# Patient Record
Sex: MALE | Race: WHITE | HISPANIC OR LATINO | URBAN - METROPOLITAN AREA
[De-identification: names, ages, dates, MRNs, and addresses within clinical notes are randomized per-mention and may not be internally consistent; named-entity substitution may affect disease eponyms.]

---

## 2017-01-05 ENCOUNTER — OUTPATIENT (OUTPATIENT)
Dept: OUTPATIENT SERVICES | Facility: HOSPITAL | Age: 32
LOS: 1 days | End: 2017-01-05
Payer: COMMERCIAL

## 2017-01-05 VITALS
WEIGHT: 184.97 LBS | TEMPERATURE: 98 F | DIASTOLIC BLOOD PRESSURE: 70 MMHG | HEART RATE: 60 BPM | SYSTOLIC BLOOD PRESSURE: 110 MMHG | OXYGEN SATURATION: 98 % | HEIGHT: 78 IN | RESPIRATION RATE: 16 BRPM

## 2017-01-05 DIAGNOSIS — Z98.890 OTHER SPECIFIED POSTPROCEDURAL STATES: Chronic | ICD-10-CM

## 2017-01-05 DIAGNOSIS — S43.432A SUPERIOR GLENOID LABRUM LESION OF LEFT SHOULDER, INITIAL ENCOUNTER: ICD-10-CM

## 2017-01-05 DIAGNOSIS — S42.292A OTHER DISPLACED FRACTURE OF UPPER END OF LEFT HUMERUS, INITIAL ENCOUNTER FOR CLOSED FRACTURE: ICD-10-CM

## 2017-01-05 DIAGNOSIS — M25.312 OTHER INSTABILITY, LEFT SHOULDER: ICD-10-CM

## 2017-01-05 DIAGNOSIS — S42.293A OTHER DISPLACED FRACTURE OF UPPER END OF UNSPECIFIED HUMERUS, INITIAL ENCOUNTER FOR CLOSED FRACTURE: ICD-10-CM

## 2017-01-05 DIAGNOSIS — Z01.818 ENCOUNTER FOR OTHER PREPROCEDURAL EXAMINATION: ICD-10-CM

## 2017-01-05 LAB
ANION GAP SERPL CALC-SCNC: 8 MMOL/L — SIGNIFICANT CHANGE UP (ref 5–17)
BUN SERPL-MCNC: 15 MG/DL — SIGNIFICANT CHANGE UP (ref 7–18)
CALCIUM SERPL-MCNC: 8.9 MG/DL — SIGNIFICANT CHANGE UP (ref 8.4–10.5)
CHLORIDE SERPL-SCNC: 106 MMOL/L — SIGNIFICANT CHANGE UP (ref 96–108)
CO2 SERPL-SCNC: 25 MMOL/L — SIGNIFICANT CHANGE UP (ref 22–31)
CREAT SERPL-MCNC: 0.89 MG/DL — SIGNIFICANT CHANGE UP (ref 0.5–1.3)
GLUCOSE SERPL-MCNC: 91 MG/DL — SIGNIFICANT CHANGE UP (ref 70–99)
HCT VFR BLD CALC: 46.1 % — SIGNIFICANT CHANGE UP (ref 39–50)
HGB BLD-MCNC: 15.9 G/DL — SIGNIFICANT CHANGE UP (ref 13–17)
MCHC RBC-ENTMCNC: 31.8 PG — SIGNIFICANT CHANGE UP (ref 27–34)
MCHC RBC-ENTMCNC: 34.4 GM/DL — SIGNIFICANT CHANGE UP (ref 32–36)
MCV RBC AUTO: 92.5 FL — SIGNIFICANT CHANGE UP (ref 80–100)
PLATELET # BLD AUTO: 262 K/UL — SIGNIFICANT CHANGE UP (ref 150–400)
POTASSIUM SERPL-MCNC: 4.1 MMOL/L — SIGNIFICANT CHANGE UP (ref 3.5–5.3)
POTASSIUM SERPL-SCNC: 4.1 MMOL/L — SIGNIFICANT CHANGE UP (ref 3.5–5.3)
RBC # BLD: 4.99 M/UL — SIGNIFICANT CHANGE UP (ref 4.2–5.8)
RBC # FLD: 11.4 % — SIGNIFICANT CHANGE UP (ref 10.3–14.5)
SODIUM SERPL-SCNC: 139 MMOL/L — SIGNIFICANT CHANGE UP (ref 135–145)
WBC # BLD: 8.1 K/UL — SIGNIFICANT CHANGE UP (ref 3.8–10.5)
WBC # FLD AUTO: 8.1 K/UL — SIGNIFICANT CHANGE UP (ref 3.8–10.5)

## 2017-01-05 PROCEDURE — 85027 COMPLETE CBC AUTOMATED: CPT

## 2017-01-05 PROCEDURE — 80048 BASIC METABOLIC PNL TOTAL CA: CPT

## 2017-01-05 PROCEDURE — G0463: CPT

## 2017-01-05 RX ORDER — SODIUM CHLORIDE 9 MG/ML
3 INJECTION INTRAMUSCULAR; INTRAVENOUS; SUBCUTANEOUS EVERY 8 HOURS
Qty: 0 | Refills: 0 | Status: DISCONTINUED | OUTPATIENT
Start: 2017-01-09 | End: 2017-01-10

## 2017-01-05 RX ORDER — ACETAMINOPHEN 500 MG
975 TABLET ORAL ONCE
Qty: 0 | Refills: 0 | Status: COMPLETED | OUTPATIENT
Start: 2017-01-09 | End: 2017-01-09

## 2017-01-05 RX ORDER — CELECOXIB 200 MG/1
200 CAPSULE ORAL ONCE
Qty: 0 | Refills: 0 | Status: COMPLETED | OUTPATIENT
Start: 2017-01-09 | End: 2017-01-09

## 2017-01-05 NOTE — H&P PST ADULT - MUSCULOSKELETAL
details… detailed exam no calf tenderness/no joint swelling/no joint erythema/left shoulder, TTP/no joint warmth/decreased ROM due to pain

## 2017-01-05 NOTE — H&P PST ADULT - ASSESSMENT
31 years old male presented with instability of left shoulder and unspecific location of left shoulder joint . 31 years old male presented with instability of left shoulder, closed Hill-Sach fracture of left shoulder  and superior glenoid labrum lesion of left shoulder . 31 years old male presented with instability of left shoulder and superior glenoid labrum lesion of left shoulder .

## 2017-01-05 NOTE — H&P PST ADULT - PROBLEM SELECTOR PLAN 2
Patient  is scheduled for left shoulder arthroscopy and anterior bankhart repair, left shoulder scalp repair and remplissage, left shoulder biceps tenodesis possible tenotomy on 1/9/17. Patient is at average risk for this surgery.

## 2017-01-05 NOTE — H&P PST ADULT - NEGATIVE ENMT SYMPTOMS
no dysphagia/hx of Schatzki's ring/no throat pain/no ear pain/no hearing difficulty no hearing difficulty/no dysphagia/hx of Schatzki's ring in 2015  -difficulty swallowing meat, pt denies dyspagia since then/no throat pain/no ear pain

## 2017-01-05 NOTE — H&P PST ADULT - FAMILY HISTORY
Mother  Still living? Yes, Estimated age: Age Unknown  Family history of breast cancer, Age at diagnosis: Age Unknown

## 2017-01-05 NOTE — H&P PST ADULT - PMH
Closed Hill-Sachs fracture of left humerus    Dysphagia    Gastritis    Instability of left shoulder joint    Schatzki's ring    Shoulder dislocation, left, sequela    Superior glenoid labrum lesion of left shoulder    Tendinitis of elbow Closed Hill-Sachs fracture of left humerus    Dysphagia    Gastritis    Instability of left shoulder joint    Schatzki's ring  in 2015  Shoulder dislocation, left, sequela    Superior glenoid labrum lesion of left shoulder    Tendinitis of elbow Dysphagia    Gastritis    Instability of left shoulder joint    Schatzki's ring  in 2015  Shoulder dislocation, left, sequela    Superior glenoid labrum lesion of left shoulder    Tendinitis of elbow

## 2017-01-05 NOTE — H&P PST ADULT - NSANTHOSAYNRD_GEN_A_CORE
No. SUZAN screening performed.  STOP BANG Legend: 0-2 = LOW Risk; 3-4 = INTERMEDIATE Risk; 5-8 = HIGH Risk

## 2017-01-05 NOTE — H&P PST ADULT - HISTORY OF PRESENT ILLNESS
31 years old male presented with left shoulder pain and limited ROM , hx of shoulder dislocation x 4 times, 2 dislocations  were returned to its position on its own. Diagnosed with instability of left shoulder and unspecific location of left shoulder joint and is scheduled for left shoulder arthroscopy and anterior bankhart repair, left shoulder scalp repair and remplissage, left shoulder biceps tenodesis possible tenotomy on 1/9/17. 31 years old male presented with left shoulder pain and limited ROM , hx of shoulder dislocation x 4 times, 2 dislocations  were returned to its position on its own. Diagnosed with instability of left shoulder and superior glenoid labrum lesion of left shoulder  and Closed Hill Sach fracture of left shoulder and is scheduled for left shoulder arthroscopy and anterior bankhart repair, left shoulder scalp repair and remplissage, left shoulder biceps tenodesis possible tenotomy on 1/9/17. 31 years old male presented with left shoulder pain and limited ROM , hx of shoulder dislocation x 4 times, 2 dislocations  were returned to its position on its own. Diagnosed with instability of left shoulder and superior glenoid labrum lesion of left shoulder  and is scheduled for left shoulder arthroscopy and anterior bankhart repair, left shoulder scalp repair and remplissage, left shoulder biceps tenodesis possible tenotomy on 1/9/17.

## 2017-01-05 NOTE — H&P PST ADULT - NEGATIVE GASTROINTESTINAL SYMPTOMS
no flatulence/no abdominal pain/no change in bowel habits/no vomiting/no diarrhea/no constipation/no nausea

## 2017-01-09 ENCOUNTER — INPATIENT (INPATIENT)
Facility: HOSPITAL | Age: 32
LOS: 0 days | Discharge: HOME | DRG: 501 | End: 2017-01-10
Attending: ORTHOPAEDIC SURGERY | Admitting: ORTHOPAEDIC SURGERY
Payer: COMMERCIAL

## 2017-01-09 VITALS
DIASTOLIC BLOOD PRESSURE: 66 MMHG | SYSTOLIC BLOOD PRESSURE: 116 MMHG | HEIGHT: 78 IN | OXYGEN SATURATION: 97 % | HEART RATE: 87 BPM | WEIGHT: 184.97 LBS | TEMPERATURE: 97 F | RESPIRATION RATE: 18 BRPM

## 2017-01-09 DIAGNOSIS — S42.292A OTHER DISPLACED FRACTURE OF UPPER END OF LEFT HUMERUS, INITIAL ENCOUNTER FOR CLOSED FRACTURE: ICD-10-CM

## 2017-01-09 DIAGNOSIS — S43.432A SUPERIOR GLENOID LABRUM LESION OF LEFT SHOULDER, INITIAL ENCOUNTER: ICD-10-CM

## 2017-01-09 DIAGNOSIS — M25.312 OTHER INSTABILITY, LEFT SHOULDER: ICD-10-CM

## 2017-01-09 DIAGNOSIS — Z98.890 OTHER SPECIFIED POSTPROCEDURAL STATES: Chronic | ICD-10-CM

## 2017-01-09 PROCEDURE — 29828 SHO ARTHRS SRG BICP TENODSIS: CPT | Mod: AS,LT

## 2017-01-09 RX ORDER — HYDROMORPHONE HYDROCHLORIDE 2 MG/ML
0.5 INJECTION INTRAMUSCULAR; INTRAVENOUS; SUBCUTANEOUS
Qty: 0 | Refills: 0 | Status: DISCONTINUED | OUTPATIENT
Start: 2017-01-09 | End: 2017-01-09

## 2017-01-09 RX ORDER — CEFAZOLIN SODIUM 1 G
1000 VIAL (EA) INJECTION EVERY 8 HOURS
Qty: 0 | Refills: 0 | Status: COMPLETED | OUTPATIENT
Start: 2017-01-09 | End: 2017-01-10

## 2017-01-09 RX ORDER — ASPIRIN/CALCIUM CARB/MAGNESIUM 324 MG
325 TABLET ORAL DAILY
Qty: 0 | Refills: 0 | Status: DISCONTINUED | OUTPATIENT
Start: 2017-01-09 | End: 2017-01-10

## 2017-01-09 RX ORDER — HYDROMORPHONE HYDROCHLORIDE 2 MG/ML
0.5 INJECTION INTRAMUSCULAR; INTRAVENOUS; SUBCUTANEOUS EVERY 4 HOURS
Qty: 0 | Refills: 0 | Status: DISCONTINUED | OUTPATIENT
Start: 2017-01-09 | End: 2017-01-10

## 2017-01-09 RX ORDER — DEXAMETHASONE 0.5 MG/5ML
8 ELIXIR ORAL ONCE
Qty: 0 | Refills: 0 | Status: DISCONTINUED | OUTPATIENT
Start: 2017-01-09 | End: 2017-01-09

## 2017-01-09 RX ORDER — SODIUM CHLORIDE 9 MG/ML
1000 INJECTION, SOLUTION INTRAVENOUS
Qty: 0 | Refills: 0 | Status: DISCONTINUED | OUTPATIENT
Start: 2017-01-09 | End: 2017-01-10

## 2017-01-09 RX ORDER — ONDANSETRON 8 MG/1
4 TABLET, FILM COATED ORAL ONCE
Qty: 0 | Refills: 0 | Status: DISCONTINUED | OUTPATIENT
Start: 2017-01-09 | End: 2017-01-09

## 2017-01-09 RX ORDER — HYDROMORPHONE HYDROCHLORIDE 2 MG/ML
0.5 INJECTION INTRAMUSCULAR; INTRAVENOUS; SUBCUTANEOUS
Qty: 0 | Refills: 0 | Status: DISCONTINUED | OUTPATIENT
Start: 2017-01-09 | End: 2017-01-10

## 2017-01-09 RX ADMIN — CELECOXIB 200 MILLIGRAM(S): 200 CAPSULE ORAL at 11:06

## 2017-01-09 RX ADMIN — Medication 100 MILLIGRAM(S): at 21:52

## 2017-01-09 RX ADMIN — SODIUM CHLORIDE 3 MILLILITER(S): 9 INJECTION INTRAMUSCULAR; INTRAVENOUS; SUBCUTANEOUS at 21:37

## 2017-01-09 RX ADMIN — Medication 975 MILLIGRAM(S): at 11:06

## 2017-01-09 NOTE — ASU PATIENT PROFILE, ADULT - PMH
Dysphagia    Gastritis    Instability of left shoulder joint    Schatzki's ring  in 2015  Shoulder dislocation, left, sequela    Superior glenoid labrum lesion of left shoulder    Tendinitis of elbow

## 2017-01-10 VITALS
RESPIRATION RATE: 16 BRPM | TEMPERATURE: 98 F | DIASTOLIC BLOOD PRESSURE: 66 MMHG | SYSTOLIC BLOOD PRESSURE: 127 MMHG | OXYGEN SATURATION: 97 % | HEART RATE: 87 BPM

## 2017-01-10 PROCEDURE — 90686 IIV4 VACC NO PRSV 0.5 ML IM: CPT

## 2017-01-10 PROCEDURE — 86900 BLOOD TYPING SEROLOGIC ABO: CPT

## 2017-01-10 PROCEDURE — 86901 BLOOD TYPING SEROLOGIC RH(D): CPT

## 2017-01-10 PROCEDURE — 88304 TISSUE EXAM BY PATHOLOGIST: CPT | Mod: 26

## 2017-01-10 PROCEDURE — 88304 TISSUE EXAM BY PATHOLOGIST: CPT

## 2017-01-10 PROCEDURE — C1713: CPT

## 2017-01-10 PROCEDURE — 99231 SBSQ HOSP IP/OBS SF/LOW 25: CPT

## 2017-01-10 PROCEDURE — 86850 RBC ANTIBODY SCREEN: CPT

## 2017-01-10 RX ORDER — CEPHALEXIN 500 MG
1 CAPSULE ORAL
Qty: 12 | Refills: 0 | OUTPATIENT
Start: 2017-01-10 | End: 2017-01-13

## 2017-01-10 RX ORDER — ASPIRIN/CALCIUM CARB/MAGNESIUM 324 MG
1 TABLET ORAL
Qty: 14 | Refills: 0 | OUTPATIENT
Start: 2017-01-10 | End: 2017-01-24

## 2017-01-10 RX ORDER — IBUPROFEN 200 MG
1 TABLET ORAL
Qty: 0 | Refills: 0 | COMMUNITY

## 2017-01-10 RX ORDER — INFLUENZA VIRUS VACCINE 15; 15; 15; 15 UG/.5ML; UG/.5ML; UG/.5ML; UG/.5ML
0.5 SUSPENSION INTRAMUSCULAR ONCE
Qty: 0 | Refills: 0 | Status: COMPLETED | OUTPATIENT
Start: 2017-01-10 | End: 2017-01-10

## 2017-01-10 RX ORDER — OXYCODONE HYDROCHLORIDE 5 MG/1
1 TABLET ORAL
Qty: 28 | Refills: 0 | OUTPATIENT
Start: 2017-01-10 | End: 2017-01-17

## 2017-01-10 RX ADMIN — INFLUENZA VIRUS VACCINE 0.5 MILLILITER(S): 15; 15; 15; 15 SUSPENSION INTRAMUSCULAR at 12:45

## 2017-01-10 RX ADMIN — SODIUM CHLORIDE 3 MILLILITER(S): 9 INJECTION INTRAMUSCULAR; INTRAVENOUS; SUBCUTANEOUS at 12:47

## 2017-01-10 RX ADMIN — SODIUM CHLORIDE 3 MILLILITER(S): 9 INJECTION INTRAMUSCULAR; INTRAVENOUS; SUBCUTANEOUS at 06:25

## 2017-01-10 RX ADMIN — Medication 100 MILLIGRAM(S): at 06:25

## 2017-01-10 RX ADMIN — Medication 325 MILLIGRAM(S): at 12:46

## 2017-01-10 NOTE — DISCHARGE NOTE ADULT - PATIENT PORTAL LINK FT
“You can access the [facility] Patient Portal, offered by [facility], by registering with the following website: [email or web address]”

## 2017-01-10 NOTE — DISCHARGE NOTE ADULT - HOSPITAL COURSE
30 y/o M s/p L shoulder arthroscopy and mini open biceps tenodesis on 1/9/17. No complications. Stable for D/c home.

## 2017-01-10 NOTE — DISCHARGE NOTE ADULT - CARE PROVIDER_API CALL
Son Schumacher), Orthopaedic Surgery  [address] [place] [address]  Phone: [phone]  Fax: [phone]

## 2017-01-10 NOTE — DISCHARGE NOTE ADULT - CARE PLAN
Principal Discharge DX:	Superior glenoid labrum lesion of left shoulder  Goal:	-S/p L shoulder arthroscopy and Mini open biceps tenodesis  Instructions for follow-up, activity and diet:	-Pain control  -Deep vein thrombosis prophylaxis- Aspirin Daily for 2 weeks  -Keep Left arm in sling- Non weight bearing unless advised by your surgeon.   -Keflex 4 times a day for 3 days  -Incentive spirometer  -Follow up with  in one week  -If tingling in fingers worsen notify MD and report to ED immediately.   -If any fever of 101 or greater report to ED.

## 2017-01-10 NOTE — DISCHARGE NOTE ADULT - MEDICATION SUMMARY - MEDICATIONS TO TAKE
I will START or STAY ON the medications listed below when I get home from the hospital:    aspirin 325 mg oral tablet  -- 1 tab(s) by mouth once a day  -- Indication: For Deep vein thrombosis prophylaxis    acetaminophen-oxyCODONE 325 mg-5 mg oral tablet  -- 1 tab(s) by mouth every 6 hours, As Needed, Mild Pain (1 - 3) MDD:5  -- Indication: For Pain conteol    cephalexin 500 mg oral tablet  -- 1 tab(s) by mouth 4 times a day  -- Finish all this medication unless otherwise directed by prescriber.    -- Indication: For Antibiotic

## 2017-01-10 NOTE — DISCHARGE NOTE ADULT - NS AS ACTIVITY OBS
Do not drive or operate machinery/No Heavy lifting/straining/Do not make important decisions/Bathing allowed/Keep L shoulder/dressing clean/dry and intact

## 2017-01-10 NOTE — DISCHARGE NOTE ADULT - PLAN OF CARE
-S/p L shoulder arthroscopy and Mini open biceps tenodesis -Pain control  -Deep vein thrombosis prophylaxis- Aspirin Daily for 2 weeks  -Keep Left arm in sling- Non weight bearing unless advised by your surgeon.   -Keflex 4 times a day for 3 days  -Incentive spirometer  -Follow up with  in one week  -If tingling in fingers worsen notify MD and report to ED immediately.   -If any fever of 101 or greater report to ED.

## 2017-01-13 DIAGNOSIS — Z23 ENCOUNTER FOR IMMUNIZATION: ICD-10-CM

## 2017-01-13 DIAGNOSIS — S43.492A OTHER SPRAIN OF LEFT SHOULDER JOINT, INITIAL ENCOUNTER: ICD-10-CM

## 2017-01-13 DIAGNOSIS — F17.210 NICOTINE DEPENDENCE, CIGARETTES, UNCOMPLICATED: ICD-10-CM

## 2017-01-13 DIAGNOSIS — S42.292A OTHER DISPLACED FRACTURE OF UPPER END OF LEFT HUMERUS, INITIAL ENCOUNTER FOR CLOSED FRACTURE: ICD-10-CM

## 2017-01-13 DIAGNOSIS — S43.432A SUPERIOR GLENOID LABRUM LESION OF LEFT SHOULDER, INITIAL ENCOUNTER: ICD-10-CM
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
## 2017-01-13 DIAGNOSIS — M25.312 OTHER INSTABILITY, LEFT SHOULDER: ICD-10-CM
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
## 2017-01-13 DIAGNOSIS — M24.412 RECURRENT DISLOCATION, LEFT SHOULDER: ICD-10-CM

## 2023-03-20 NOTE — H&P PST ADULT - DOES PATIENT HAVE ADVANCE DIRECTIVE
Caller: [facility] [id number] - [place], [place] - [address] [facility] [address] - [phone]  - [phone] FX    Relationship: Pharmacy    Best call back number: [phone]    What is the best time to reach you: ANY    Who are you requesting to speak with (clinical staff, provider,  specific staff member): CLINICAL    What was the call regarding: THE PHARMACY CALLING TO VERIFY THE DOSAGE INSTRUCTIONS FOR THE RAMIPRIL.    Do you require a callback: YES, PLEASE.     THANK YOU.          No/in case of emergency call patient's mother- she is to make decision about pt